# Patient Record
Sex: MALE | Race: WHITE | Employment: PART TIME | URBAN - NONMETROPOLITAN AREA
[De-identification: names, ages, dates, MRNs, and addresses within clinical notes are randomized per-mention and may not be internally consistent; named-entity substitution may affect disease eponyms.]

---
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
## 2020-02-10 ENCOUNTER — APPOINTMENT (OUTPATIENT)
Dept: OCCUPATIONAL MEDICINE | Facility: OTHER | Age: 20
End: 2020-02-10

## 2020-04-15 ENCOUNTER — APPOINTMENT (OUTPATIENT)
Dept: OCCUPATIONAL MEDICINE | Facility: OTHER | Age: 20
End: 2020-04-15

## 2020-08-10 ENCOUNTER — HOSPITAL ENCOUNTER (EMERGENCY)
Facility: HOSPITAL | Age: 20
Discharge: HOME | End: 2020-08-10
Attending: NURSE PRACTITIONER | Admitting: NURSE PRACTITIONER
Payer: COMMERCIAL

## 2020-08-10 VITALS
TEMPERATURE: 96.8 F | SYSTOLIC BLOOD PRESSURE: 122 MMHG | OXYGEN SATURATION: 97 % | DIASTOLIC BLOOD PRESSURE: 75 MMHG | RESPIRATION RATE: 16 BRPM

## 2020-08-10 DIAGNOSIS — L08.9 TOE INFECTION: ICD-10-CM

## 2020-08-10 PROCEDURE — G0463 HOSPITAL OUTPT CLINIC VISIT: HCPCS

## 2020-08-10 PROCEDURE — 99213 OFFICE O/P EST LOW 20 MIN: CPT | Mod: Z6 | Performed by: NURSE PRACTITIONER

## 2020-08-10 RX ORDER — CEPHALEXIN 500 MG/1
500 CAPSULE ORAL 3 TIMES DAILY
Qty: 15 CAPSULE | Refills: 0 | Status: SHIPPED | OUTPATIENT
Start: 2020-08-10 | End: 2020-08-15

## 2020-08-10 ASSESSMENT — ENCOUNTER SYMPTOMS
ACTIVITY CHANGE: 1
NAUSEA: 0
ROS SKIN COMMENTS: RIGHT TOE
CHILLS: 0
VOMITING: 0
COLOR CHANGE: 1
FEVER: 0

## 2020-08-10 NOTE — ED NOTES
Patient discharged with understanding of instructions and recommendations.   Denies any questions or concerns.     Karina Herrera LPN on 8/10/2020 at 10:46 AM

## 2020-08-10 NOTE — ED NOTES
Affected toe dressed with triple antibiotic ointment and covered with a band aid.     Karina Herrera LPN on 8/10/2020 at 10:42 AM

## 2020-08-10 NOTE — DISCHARGE INSTRUCTIONS
Apply bacitracin and do twice daily dressing changes for the next 48 hours. You may shower . Soak your toe in Epsom salts twice daily until healed. If you have increased pain, redness at wound site, fevers, or abnormal drainage (purulent/pus) you need to see your primary care provider or return to Urgent Care/ER immediately. Acetaminophen/tylenol  or ibuprofen for pain. Complete all antibiotics even if feeling better.  Take antibiotics with food unless instructed otherwise. Yogurt or probiotics may help decrease stomach upset and diarrhea.  Podiatry referral placed

## 2020-08-10 NOTE — ED TRIAGE NOTES
Patient presents today with c/o right great toe irritation.   Ongoing irritation for 2 weeks.   States thinks is possibly infected  3/10  Redness / swelling present  Minor pain when ambulating   Treating with neosporin / bandaid.   Denies fevers, chills, nausea, vomiting.   Has never had similar issues in past.

## 2020-08-10 NOTE — ED AVS SNAPSHOT
[facility]  [address]  [place] [address]  Phone:  [phone]                                    London Hartmann   MRN: [id number]    Department[facility]  [facility]   Date of Visit:  8/10/2020           After Visit Summary Signature Page    I have received my discharge instructions, and my questions have been answered. I have discussed any challenges I see with this plan with the nurse or doctor.    ..........................................................................................................................................  Patient/Patient Representative Signature      ..........................................................................................................................................  Patient Representative Print Name and Relationship to Patient    ..................................................               ................................................  Date                                   Time    ..........................................................................................................................................  Reviewed by Signature/Title    ...................................................              ..............................................  Date                                               Time          22EPIC Rev 08/18

## 2020-08-10 NOTE — ED TRIAGE NOTES
Patient presents with concerns about an infected right great toe nail/ingrown toenail.  Patient first noticed symptoms a couple of weeks ago.

## 2020-08-10 NOTE — ED PROVIDER NOTES
History     Chief Complaint   Patient presents with     Wound Check     HPI  London Hartmann is a 20 year old male who presents with right great toe pain, swelling and redness for the past two weeks. He has been applying Neosporin and a band-aid. Non-smoker. Denies fevers, chills, nausea, vomiting, diarrhea, and shortness of breath.    Allergies:  Allergies   Allergen Reactions     Asa [Aspirin]      'borderline bleeder'       Problem List:    There are no active problems to display for this patient.       Past Medical History:    History reviewed. No pertinent past medical history.    Past Surgical History:    History reviewed. No pertinent surgical history.    Family History:    No family history on file.    Social History:  Marital Status:  Single [1]  Social History     Tobacco Use     Smoking status: Passive Smoke Exposure - Never Smoker     Smokeless tobacco: Never Used   Substance Use Topics     Alcohol use: No     Drug use: No        Medications:    cephALEXin (KEFLEX) 500 MG capsule          Review of Systems   Constitutional: Positive for activity change. Negative for chills and fever.   Gastrointestinal: Negative for nausea and vomiting.   Skin: Positive for color change.        Right toe        Physical Exam   BP: 122/75  Heart Rate: 73  Temp: 96.8  F (36  C)  Resp: 16  SpO2: 97 %      Physical Exam  Vitals signs and nursing note reviewed.   Constitutional:       General: He is in acute distress (mild).   Cardiovascular:      Rate and Rhythm: Normal rate.      Pulses:           Dorsalis pedis pulses are 3+ on the right side.        Posterior tibial pulses are 3+ on the right side.   Pulmonary:      Effort: Pulmonary effort is normal.   Musculoskeletal:         General: Swelling and tenderness present.        Feet:    Feet:      Right foot:      Toenail Condition: Right toenails are ingrown.   Skin:     General: Skin is warm and dry.      Capillary Refill: Capillary refill takes less than 2 seconds.       Findings: Erythema present.   Neurological:      Mental Status: He is alert and oriented to person, place, and time.   Psychiatric:         Behavior: Behavior normal.         ED Course        Procedures             No results found for this or any previous visit (from the past 24 hour(s)).    Medications - No data to display    Assessments & Plan (with Medical Decision Making)     I have reviewed the nursing notes.    I have reviewed the findings, diagnosis, plan and need for follow up with the patient.  (L08.9) Toe infection  Comment: 20 year old male who presents with right great toe pain, swelling and redness for the past two weeks. He has been applying Neosporin and a band-aid. Non-smoker. Denies fevers, chills, nausea, vomiting, diarrhea, and shortness of breath.    Assessment: Lateral nail fold and end of toe are erythematous, swollen, and have serous drainage. Very tender to touch.     Plan: Cephalexin tid for five days. Education provided and discussed for this/these medication and for wound check.  Apply bacitracin and do twice daily dressing changes for the next 48 hours. You may shower . Soak your toe in Epsom salts twice daily until healed. If you have increased pain, redness at wound site, fevers, or abnormal drainage (purulent/pus) you need to see your primary care provider or return to Urgent Care/ER immediately. Acetaminophen/tylenol  or ibuprofen for pain. Complete all antibiotics even if feeling better.  Take antibiotics with food unless instructed otherwise. Yogurt or probiotics may help decrease stomach upset and diarrhea.  Podiatry referral placed  These discharge instructions and medications were reviewed with him and understanding verbalized.    Discharge Medication List as of 8/10/2020 10:43 AM      START taking these medications    Details   cephALEXin (KEFLEX) 500 MG capsule Take 1 capsule (500 mg) by mouth 3 times daily for 5 days, Disp-15 capsule,R-0, E-Prescribe             Final  diagnoses:   Toe infection       8/10/2020   [facility]       Chiquis Dean, CNP  08/10/20 [id number]

## 2020-08-13 ENCOUNTER — APPOINTMENT (OUTPATIENT)
Dept: OCCUPATIONAL MEDICINE | Facility: OTHER | Age: 20
End: 2020-08-13

## 2020-08-28 ENCOUNTER — OFFICE VISIT (OUTPATIENT)
Dept: PODIATRY | Facility: OTHER | Age: 20
End: 2020-08-28
Attending: PODIATRIST
Payer: COMMERCIAL

## 2020-08-28 VITALS
TEMPERATURE: 97 F | DIASTOLIC BLOOD PRESSURE: 68 MMHG | WEIGHT: 158.9 LBS | OXYGEN SATURATION: 98 % | HEART RATE: 72 BPM | SYSTOLIC BLOOD PRESSURE: 100 MMHG

## 2020-08-28 DIAGNOSIS — M79.671 RIGHT FOOT PAIN: ICD-10-CM

## 2020-08-28 DIAGNOSIS — L60.0 INGROWN TOENAIL OF RIGHT FOOT: Primary | ICD-10-CM

## 2020-08-28 DIAGNOSIS — L08.9 TOE INFECTION: ICD-10-CM

## 2020-08-28 PROCEDURE — 99203 OFFICE O/P NEW LOW 30 MIN: CPT | Mod: 25 | Performed by: PODIATRIST

## 2020-08-28 PROCEDURE — 11750 EXCISION NAIL&NAIL MATRIX: CPT | Mod: T5 | Performed by: PODIATRIST

## 2020-08-28 ASSESSMENT — PAIN SCALES - GENERAL: PAINLEVEL: MODERATE PAIN (4)

## 2020-08-28 NOTE — PATIENT INSTRUCTIONS
Nail procedure care:  -Start epsom salt soaks tomorrow. Soak the foot 1-2 times a day for 20 minutes.  -Apply an antibiotic cream, gauze and a bandaid over the toe for the first week after the procedure.  -After one week, switch to a betadine dressing. Apply a small amount of betadine on gauze or dab the betadine over the toe with gauze and apply another dry gauze over the toe followed by a band aid or tape.  -Do not apply a band aid directly over the nail procedure site without gauze.     Keep the toe covered at all times until it is completely healed.  -You may develop a black scab over the nail bed--let this fall off on its own and don't pick at it.  -The toe may drain for 2-3 weeks. It is normal for it to have a clear drainage.    Watching for signs of infection:  If the toe has a thick, white pus coming from the procedure site or if the the toe becomes red, swollen, painful, or you begin to feel sick (fever/chills/nausea/vomitting), return to the podiatry clinic immediately or to the emergency room if after hours.      Thank you for allowing  and our Podiatry team to participate in your care. Please call our office at [phone] with scheduling questions or with any other questions or concerns.

## 2020-08-28 NOTE — NURSING NOTE
"Chief Complaint   Patient presents with     toe infection       Initial /68 (BP Location: Left arm, Patient Position: Sitting, Cuff Size: Adult Regular)   Pulse 72   Temp 97  F (36.1  C) (Tympanic)   Wt 72.1 kg (158 lb 14.4 oz)   SpO2 98%  Estimated body mass index is 17.26 kg/m  as calculated from the following:    Height as of 12/12/14: 1.702 m (5' 7.01\").    Weight as of 12/12/14: 50 kg (110 lb 3.2 oz).  Medication Reconciliation: complete  Mackenzie Stafford LPN  "

## 2020-08-28 NOTE — PROGRESS NOTES
Chief complaint: Patient presents with:  toe infection        History of Present Illness: This 20 year old male is seen at the request of Jermaine for evaluation and suggestions of management of an ingrown toenail on the RIGHT hallux medial nail border. It has been present about a month but got worse the past week. He is not on antibiotics. The toenail is painful in his shoe gear but walking isn't as painful unless his foot gets caught on something.    No further pedal complaints today.     Patient does not use tobacco products.       /68 (BP Location: Left arm, Patient Position: Sitting, Cuff Size: Adult Regular)   Pulse 72   Temp 97  F (36.1  C) (Tympanic)   Wt 72.1 kg (158 lb 14.4 oz)   SpO2 98%     There is no problem list on file for this patient.      History reviewed. No pertinent surgical history.    No current outpatient medications on file.     No current facility-administered medications for this visit.           Allergies   Allergen Reactions     Asa [Aspirin]      'borderline bleeder'       History reviewed. No pertinent family history.    Social History     Socioeconomic History     Marital status: Single     Spouse name: None     Number of children: None     Years of education: None     Highest education level: None   Occupational History     None   Social Needs     Financial resource strain: None     Food insecurity     Worry: None     Inability: None     Transportation needs     Medical: None     Non-medical: None   Tobacco Use     Smoking status: Passive Smoke Exposure - Never Smoker     Smokeless tobacco: Never Used   Substance and Sexual Activity     Alcohol use: No     Drug use: No     Sexual activity: None   Lifestyle     Physical activity     Days per week: None     Minutes per session: None     Stress: None   Relationships     Social connections     Talks on phone: None     Gets together: None     Attends [employer] witness service: None     Active member of club or organization: None      Attends meetings of clubs or organizations: None     Relationship status: None     Intimate partner violence     Fear of current or ex partner: None     Emotionally abused: None     Physically abused: None     Forced sexual activity: None   Other Topics Concern     Parent/sibling w/ CABG, MI or angioplasty before 65F 55M? Not Asked   Social History Narrative     None       ROS: 10 point ROS neg other than the symptoms noted above in the HPI.  EXAM  Constitutional: healthy, alert and no distress    Psychiatric: mentation appears normal and affect normal/bright    VASCULAR:  -Dorsalis pedis pulse +2/4 b/l  -Posterior tibial pulse +2/4 b/l  -Capillary refill time < 3 seconds to b/l hallux  -Hair growth Present to b/l anterior legs and ankles  NEURO:  -Light touch sensation intact to b/l plantar forefoot  DERM:  -Incurvation to the medial border of the RIGHT hallux  ---Mild erythema and edema to the nail border  ---Mild mild purulent drainage  ---No severe erythema, no ascending erythema, no calor, no purulence, no malodor, no other SOI.    -Skin temperature, texture and turgor WNL b/l  MSK:  -Pain on palpation to RIGHT hallux medial nail border  -Muscle strength of ankles +5/5 for dorsiflexion, plantarflexion, ABDUction and ADDuction b/l  ============================================================    ASSESSMENT:  (L60.0) Ingrown toenail of right foot  (primary encounter diagnosis)    (L08.9) Toe infection    (M79.671) Right foot pain      PLAN:  -Patient evaluated and examined. Treatment options discussed with no educational barriers noted.  -Discussed nail procedure options and etiologies and treatments for ingrown toenails. Conservatively, patient could opt for a slant back today and keep monitoring the toe since there are no SOI. Discussed risks and benefits and healing course of a nail border avulsion vs. Matrixectomy including post procedure infection or a non-healing wound, both of which could lead to a life  "threatening infection or amputation of the foot or leg or a proximal amputation. Patient understands the risks and benefits and has decided to proceed with a matrixectomy of the nail border.    --Matrixectomy of right hallux Medial border: Written and verbal consent obtained after reviewing risks and benefits of the procedure. Patient understands that although phenol is used in attempt to prevent regrowth of the ingrown toenail, the nail can still grow back. There is also a risk of post procedure infection. A severe foot infection could lead to a proximal foot or leg amputation or loss of life, so the patient is advised to return to podiatry or the ED immediately if the patient notices any SOI. The patient is in agreement with this plan and wishes to proceed with the procedure. A time-out was performed to identify the correct patient, limb, digit and procedure.    An alcohol prep pad was applied to  to the base of the right hallux. The digit was injected with 6 mL of 1:1 of 2% Lidocaine plain and 0.5% marcaine plain. Adequate local anesthesia was obtained. A ring tournicot was applied to the digit and a chloroprep was applied to the hallux. A freer was used to loosen the nail from the underlying nail bed. An English Anvil and a hemostat were then used to remove the medial nail border. A total of three applications of phenol were applied for 30 seconds per application. The digit was rinsed thoroughly with alcohol. The tournicot was removed from the toe and there was a prompt hyperemic response to the hallux. The wound was then dressed with an Silvadene, gauze and 1\" coban. The patient was educated on after procedure care including daily epsom salt soaks starting tomorrow followed by dressing of the toe with an antibiotic cream and a bandaid until the wound site on the toe stops draining (2-3 weeks). Provided education on how to look for signs of infection (redness, swelling, pain, purulence, fever, chills, nausea, " vomiting) and the patient was instructed to return to the clinic or Emergency Department immediately if there are any signs of infection.    -Patient in agreement with the above treatment plan and all of patient's questions were answered.      RTC as needed        Nedra Potter DPM

## 2020-11-22 ENCOUNTER — HEALTH MAINTENANCE LETTER (OUTPATIENT)
Age: 20
End: 2020-11-22

## 2021-09-18 ENCOUNTER — HEALTH MAINTENANCE LETTER (OUTPATIENT)
Age: 21
End: 2021-09-18

## 2022-01-08 ENCOUNTER — HEALTH MAINTENANCE LETTER (OUTPATIENT)
Age: 22
End: 2022-01-08

## 2022-11-20 ENCOUNTER — HEALTH MAINTENANCE LETTER (OUTPATIENT)
Age: 22
End: 2022-11-20

## 2023-04-15 ENCOUNTER — HEALTH MAINTENANCE LETTER (OUTPATIENT)
Age: 23
End: 2023-04-15

## 2023-06-02 NOTE — DISCHARGE INSTRUCTIONS
Wound Check, No Infection  There are no signs of infection.   Home care  Continue to care for your wound as directed.    Cover your wound with a bandage unless your healthcare provider tells you not to.    Unless told otherwise, avoid swimming and taking tub baths until your wound has healed.    Wear splint while awake and active. Remove to sleep.   Follow-up care  Follow up with your healthcare provider as advised.    If surgical tape strips were used, you can remove them yourself if they have not fallen off by 10 days after they were applied.   When to seek medical advice  Call your healthcare provider right away if any of these occur:    Fever of 100.4 F (38 C) or higher, or as directed by your health care provider    Symptoms of a wound infection, including:    Redness or swelling around the wound    Warmth coming from the wound    New or worsening pain    Red streaks around the wound    Draining pus     
no history of blood product transfusion